# Patient Record
Sex: FEMALE | Race: BLACK OR AFRICAN AMERICAN
[De-identification: names, ages, dates, MRNs, and addresses within clinical notes are randomized per-mention and may not be internally consistent; named-entity substitution may affect disease eponyms.]

---

## 2018-08-04 ENCOUNTER — HOSPITAL ENCOUNTER (EMERGENCY)
Dept: HOSPITAL 26 - MED | Age: 43
Discharge: HOME | End: 2018-08-04
Payer: COMMERCIAL

## 2018-08-04 VITALS — BODY MASS INDEX: 27.23 KG/M2 | HEIGHT: 62 IN | WEIGHT: 148 LBS

## 2018-08-04 VITALS — DIASTOLIC BLOOD PRESSURE: 72 MMHG | SYSTOLIC BLOOD PRESSURE: 134 MMHG

## 2018-08-04 VITALS — SYSTOLIC BLOOD PRESSURE: 120 MMHG | DIASTOLIC BLOOD PRESSURE: 71 MMHG

## 2018-08-04 DIAGNOSIS — K57.30: Primary | ICD-10-CM

## 2018-08-04 DIAGNOSIS — Z88.8: ICD-10-CM

## 2018-08-04 LAB
ALBUMIN FLD-MCNC: 4 G/DL (ref 3.4–5)
AMYLASE SERPL-CCNC: 96 U/L (ref 25–115)
ANION GAP SERPL CALCULATED.3IONS-SCNC: 12.6 MMOL/L (ref 8–16)
APPEARANCE UR: (no result)
AST SERPL-CCNC: 19 U/L (ref 15–37)
BASOPHILS # BLD AUTO: 0.1 K/UL (ref 0–0.22)
BASOPHILS NFR BLD AUTO: 1.1 % (ref 0–2)
BILIRUB SERPL-MCNC: 1.2 MG/DL (ref 0–1)
BILIRUB UR QL STRIP: NEGATIVE
BUN SERPL-MCNC: 8 MG/DL (ref 7–18)
CHLORIDE SERPL-SCNC: 104 MMOL/L (ref 98–107)
CO2 SERPL-SCNC: 27.4 MMOL/L (ref 21–32)
COLOR UR: YELLOW
CREAT SERPL-MCNC: 1 MG/DL (ref 0.6–1.3)
EOSINOPHIL # BLD AUTO: 0.1 K/UL (ref 0–0.4)
EOSINOPHIL NFR BLD AUTO: 1.9 % (ref 0–4)
ERYTHROCYTE [DISTWIDTH] IN BLOOD BY AUTOMATED COUNT: 13.7 % (ref 11.6–13.7)
GFR SERPL CREATININE-BSD FRML MDRD: 79 ML/MIN (ref 90–?)
GLUCOSE SERPL-MCNC: 90 MG/DL (ref 74–106)
GLUCOSE UR STRIP-MCNC: NEGATIVE MG/DL
HCT VFR BLD AUTO: 43.3 % (ref 36–48)
HGB BLD-MCNC: 13.8 G/DL (ref 12–16)
HGB UR QL STRIP: (no result)
LEUKOCYTE ESTERASE UR QL STRIP: NEGATIVE
LIPASE SERPL-CCNC: 110 U/L (ref 73–393)
LYMPHOCYTES # BLD AUTO: 1.2 K/UL (ref 2.5–16.5)
LYMPHOCYTES NFR BLD AUTO: 22.6 % (ref 20.5–51.1)
MCH RBC QN AUTO: 27 PG (ref 27–31)
MCHC RBC AUTO-ENTMCNC: 32 G/DL (ref 33–37)
MCV RBC AUTO: 85 FL (ref 80–94)
MONOCYTES # BLD AUTO: 0.4 K/UL (ref 0.8–1)
MONOCYTES NFR BLD AUTO: 6.5 % (ref 1.7–9.3)
NEUTROPHILS # BLD AUTO: 3.7 K/UL (ref 1.8–7.7)
NEUTROPHILS NFR BLD AUTO: 67.9 % (ref 42.2–75.2)
NITRITE UR QL STRIP: NEGATIVE
PH UR STRIP: 6 [PH] (ref 5–9)
PLATELET # BLD AUTO: 245 K/UL (ref 140–450)
POTASSIUM SERPL-SCNC: 4 MMOL/L (ref 3.5–5.1)
RBC # BLD AUTO: 5.09 MIL/UL (ref 4.2–5.4)
RBC #/AREA URNS HPF: (no result) /HPF (ref 0–5)
SODIUM SERPL-SCNC: 140 MMOL/L (ref 136–145)
WBC # BLD AUTO: 5.5 K/UL (ref 4.8–10.8)
WBC,URINE: (no result) /HPF (ref 0–5)

## 2018-08-04 PROCEDURE — 99285 EMERGENCY DEPT VISIT HI MDM: CPT

## 2018-08-04 PROCEDURE — 83690 ASSAY OF LIPASE: CPT

## 2018-08-04 PROCEDURE — 36415 COLL VENOUS BLD VENIPUNCTURE: CPT

## 2018-08-04 PROCEDURE — 74176 CT ABD & PELVIS W/O CONTRAST: CPT

## 2018-08-04 PROCEDURE — 82150 ASSAY OF AMYLASE: CPT

## 2018-08-04 PROCEDURE — 76705 ECHO EXAM OF ABDOMEN: CPT

## 2018-08-04 PROCEDURE — 84703 CHORIONIC GONADOTROPIN ASSAY: CPT

## 2018-08-04 PROCEDURE — 85025 COMPLETE CBC W/AUTO DIFF WBC: CPT

## 2018-08-04 PROCEDURE — 81025 URINE PREGNANCY TEST: CPT

## 2018-08-04 PROCEDURE — 96375 TX/PRO/DX INJ NEW DRUG ADDON: CPT

## 2018-08-04 PROCEDURE — 96374 THER/PROPH/DIAG INJ IV PUSH: CPT

## 2018-08-04 PROCEDURE — 81001 URINALYSIS AUTO W/SCOPE: CPT

## 2018-08-04 PROCEDURE — 80053 COMPREHEN METABOLIC PANEL: CPT

## 2018-08-04 NOTE — NUR
PATIENT PRESENTS TO ED WITH luq pain "knots" type pain--fatigue general 
weakness . PT STATES  . DENIES N/V/D; SKIN IS PINK/WARM/DRY; AAOX4 WITH EVEN 
AND STEADY GAIT; LUNGS CLEAR BL; HR EVEN AND REGULAR; PT DENIES ANY FEVER, CP, 
SOB, OR COUGH AT THIS TIME; PATIENT STATES PAIN OF 7/10 AT THIS TIME; VSS; 
PATIENT POSITIONED FOR COMFORT; HOB ELEVATED; BEDRAILS UP X2; BED DOWN. ER MD 
MADE AWARE OF PT STATUS.

## 2018-08-04 NOTE — NUR
Patient discharged with v/s stable. Written and verbal after care instructions 
given and explained. 

Patient alert, oriented and verbalized understanding of instructions. 
Ambulatory with steady gait. All questions addressed prior to discharge. ID 
band removed. Patient advised to follow up with PMD. Rx of reglan/ levsin/ 
given. Patient educated on indication of medication including possible reaction 
and side effects. Opportunity to ask questions provided and answered.

## 2021-12-06 ENCOUNTER — HOSPITAL ENCOUNTER (EMERGENCY)
Dept: HOSPITAL 26 - MED | Age: 46
Discharge: HOME | End: 2021-12-06
Payer: COMMERCIAL

## 2021-12-06 VITALS — DIASTOLIC BLOOD PRESSURE: 87 MMHG | SYSTOLIC BLOOD PRESSURE: 120 MMHG

## 2021-12-06 VITALS — WEIGHT: 149 LBS | HEIGHT: 61 IN | BODY MASS INDEX: 28.13 KG/M2

## 2021-12-06 VITALS — DIASTOLIC BLOOD PRESSURE: 70 MMHG | SYSTOLIC BLOOD PRESSURE: 128 MMHG

## 2021-12-06 DIAGNOSIS — R10.33: Primary | ICD-10-CM

## 2021-12-06 DIAGNOSIS — Z88.1: ICD-10-CM

## 2021-12-06 DIAGNOSIS — Z98.890: ICD-10-CM

## 2021-12-06 DIAGNOSIS — Z88.2: ICD-10-CM

## 2021-12-06 NOTE — NUR
46 YEARS OLD FEMALE ALERT, ORIENTED X4 WALKING TO ER C/O ABDOMINAL PAIN 
UMBILICAL DRAINAGE, AFTER HERNIA REPAIR/MIMIEMEKA SOARES 9 MONTHS AGO, DENIES NAUSEA 
VOMITIGN DIARRHEA/CONSTIPATION.